# Patient Record
(demographics unavailable — no encounter records)

---

## 2017-02-16 NOTE — ECGEPIP
Stationary ECG Study

                              [facility]

                                       

                                       Test Date:    2017

Pat Name:     MONSERRAT HARKINS          Department:   

Patient ID:   [id number]                 Room:         -

Gender:       M                        Technician:   

:          1954               Requested By: JEET JOHNSON

Order Number: [id number]     Reading MD:   Jimbo Aguilar

                                 Measurements

Intervals                              Axis          

Rate:         94                       P:            77

VT:           169                      QRS:          59

QRSD:         92                       T:            71

QT:           329                                    

QTc:          412                                    

                           Interpretive Statements

Normal sinus rhythm

Low voltages, slow precordial R-wave progression and persistent S waves

V5 and V6

Body habitus versus pulmonary disease.

No prior tracing for comparison

Electronically Signed On 2017 9:13:32 EST by Jimbo Aguilar

## 2017-02-16 NOTE — RO
DATE OF PROCEDURE: 02/16/2017

 

PREOPERATIVE DIAGNOSIS: Left kidney stone.

 

POSTOPERATIVE DIAGNOSIS: Left kidney stone.

 

FINDINGS: 8 mm left kidney stone.

 

PROCEDURE:  Left extracorporeal shock wave lithotripsy.

 

SURGEON: Dr. Abilio Adams MD

 

ASSISTANT: None.

 

ANESTHESIA: General.

 

COMPLICATIONS: None.

 

ESTIMATED BLOOD LOSS: N/A.

 

HISTORY OF THE PRESENT ILLNESS: A 62-year-old male patient with an 8 mm renal

pelvis stone. The patient has consented for a left extracorporeal shock wave

lithotripsy.

 

DESCRIPTION OF OPERATION: With the patient in the supine position under general

anesthesia, after finding the stone with x-ray and ultrasound, we found a stone

of 8 mm in diameter in the upper pole of the left kidney. We gave a total of 2500

shock wave lithotripsies at a power of 1 to 20. The first 100 shock wave

lithotripsy was done at a power of 1 to 5, the final 100 shock waves were done at

a level of 6 to 10, the following 100 shock waves were done at a level of 11 to

15 and the following 2200 shock waves were done at level of 16 to 20.  There were

no complications of surgery. The patient tolerated well the procedure.

 

PLAN: The patient will go home today with Flomax and pain medication. He will

followup at [facility] in 3 weeks and to assess if the stone has

been pulverized or is still persistent. If it is still persistent, we will give a

second session or even a third session. There were no complications during the

procedure.

## 2017-03-09 NOTE — REP
KUB:  Single view.

 

History:  Kidney stone left-sided.

 

Comparison KUB February 16, 2017.  Comparison CT study January 27, 2017.

 

Findings:  The calcific opacity previously noted in the intrarenal collecting

system of the left kidney is no longer apparent.  No ureteral or upper tract

calculus is visible today.  There is vascular calcification in the pelvis

bilaterally.  There are clips in the right mid abdomen.  Bowel gas pattern is

normal.

 

Impression:

 

No urinary tract calculus visible today.

 

 

Signed by

José Loo MD 03/09/2017 01:14 P

## 2017-06-06 NOTE — ROOR
________________________________________________________________________________

Patient Name: Adams Barron          Procedure Date: 6/6/2017 11:13 AM

MRN: [id number]                          Account Number: [id number]

YOB: 1954               Age: 63

Room: [facility]                            Gender: Male

Note Status: Finalized                 

________________________________________________________________________________

 

Procedure:           Colonoscopy

Indications:         High risk colon cancer surveillance: Personal history of 

                     colonic polyps, Last colonoscopy 5 years or more ago

Providers:           Matthieu Winchester MD

Referring MD:        Natalia Andre MD

Requesting Provider: 

Medicines:           Monitored Anesthesia Care

Complications:       No immediate complications.

________________________________________________________________________________

Procedure:           Pre-Anesthesia Assessment:

                     - Prior to the procedure, a History and Physical was 

                     performed, and patient medications and allergies were 

                     reviewed. The patient is competent. The risks and 

                     benefits of the procedure and the sedation options and 

                     risks were discussed with the patient. All questions were 

                     answered and informed consent was obtained. Patient 

                     identification and proposed procedure were verified by 

                     the physician, the nurse and the anesthesiologist in the 

                     procedure room. Mental Status Examination: alert and 

                     oriented. Airway Examination: normal oropharyngeal airway 

                     and neck mobility. CV Examination: regular rate and 

                     rhythm. Prophylactic Antibiotics: The patient does not 

                     require prophylactic antibiotics. Prior Anticoagulants: 

                     The patient has taken no previous anticoagulant or 

                     antiplatelet agents. ASA Grade Assessment: I - A normal, 

                     healthy patient. After reviewing the risks and benefits, 

                     the patient was deemed in satisfactory condition to 

                     undergo the procedure. The anesthesia plan was to use 

                     monitored anesthesia care (MAC). Immediately prior to 

                     administration of medications, the patient was 

                     re-assessed for adequacy to receive sedatives. The heart 

                     rate, respiratory rate, oxygen saturations, blood 

                     pressure, adequacy of pulmonary ventilation, and response 

                     to care were monitored throughout the procedure. The 

                     physical status of the patient was re-assessed after the 

                     procedure.

                     The was introduced through the anus and advanced to the 

                     cecum, identified by appendiceal orifice and ileocecal 

                     valve. The colonoscopy was performed without difficulty. 

                     The patient tolerated the procedure well. The quality of 

                     the bowel preparation was excellent.

                                                                                

Findings:

     The perianal and digital rectal examinations were normal.

     A few small-mouthed diverticula were found in the sigmoid colon.

     Two sessile polyps were found in the descending colon. The polyps were 4 

     mm in size. These polyps were removed with a hot snare. Resection and 

     retrieval were complete.

     A 7 mm polyp was found at 20 cm proximal to the anus. The polyp was 

     sessile. The polyp was removed with a hot snare. Resection and retrieval 

     were complete. There were a few small sessile polyps consistent with 

     hyperplastic polyps.

                                                                                

Impression:          - Diverticulosis in the sigmoid colon.

                     - Two 4 mm polyps in the descending colon, removed with a 

                     hot snare. Resected and retrieved.

                     - One 7 mm polyp at 20 cm proximal to the anus, removed 

                     with a hot snare. Resected and retrieved.

Recommendation:      - Await pathology results.

                     - Telephone endoscopist for pathology results in 10 days.

                     - Discharge patient to home.

                     - Resume previous diet.

                     - Continue present medications.

                                                                                

 

__________________

Matthieu Winchester MD

6/6/2017 12:01:36 PM

Number of Addenda: 0

 

Note Initiated On: 6/6/2017 11:13 AM

Estimated Blood Loss:

     Estimated blood loss: none.

## 2019-03-01 NOTE — REP
KUB, ONE VIEW:

 

HISTORY:  Renal stone.

 

COMPARISON:  CT 01/27/2017.

 

A small amount of air is present in small and large intestine.  There are no air

fluid levels or dilated loops of intestine.  There is no pneumoperitoneum.  An 8

mm calcification is present overlying the left kidney.  This corresponds to the

calcification     seen in the left renal pelvis in the recent CT examination.

Surgical clips are present in the right abdomen.

 

IMPRESSION:

 

1.  Nonspecific bowel gas pattern.

 

2.  There is an 8 mm calcification overlying the left kidney corresponding to the

calcification      seen in the left renal pelvis in the recent CT examination.

 

 

Signed by

Du Carranza MD 02/16/2017 08:30 A Topical Sulfur Applications Counseling: Topical Sulfur Counseling: Patient counseled that this medication may cause skin irritation or allergic reactions. In the event of skin irritation, the patient was advised to reduce the amount of the drug applied or use it less frequently. The patient verbalized understanding of the proper use and possible adverse effects of topical sulfur application. All of the patient's questions and concerns were addressed. Azithromycin Counseling:  I discussed with the patient the risks of azithromycin including but not limited to GI upset, allergic reaction, drug rash, diarrhea, and yeast infections. Benzoyl Peroxide Pregnancy And Lactation Text: This medication is Pregnancy Category C. It is unknown if benzoyl peroxide is excreted in breast milk. High Dose Vitamin A Pregnancy And Lactation Text: High dose vitamin A therapy is contraindicated during pregnancy and breast feeding. Doxycycline Counseling:  Patient counseled regarding possible photosensitivity and increased risk for sunburn. Patient instructed to avoid sunlight, if possible. When exposed to sunlight, patients should wear protective clothing, sunglasses, and sunscreen. The patient was instructed to call the office immediately if the following severe adverse effects occur:  hearing changes, easy bruising/bleeding, severe headache, or vision changes. The patient verbalized understanding of the proper use and possible adverse effects of doxycycline. All of the patient's questions and concerns were addressed. Birth Control Pills Counseling: Birth Control Pill Counseling: I discussed with the patient the potential side effects of OCPs including but not limited to increased risk of stroke, heart attack, thrombophlebitis, deep venous thrombosis, hepatic adenomas, breast changes, GI upset, headaches, and depression. The patient verbalized understanding of the proper use and possible adverse effects of OCPs. All of the patient's questions and concerns were addressed. Spironolactone Pregnancy And Lactation Text: This medication can cause feminization of the male fetus and should be avoided during pregnancy. The active metabolite is also found in breast milk. Detail Level: Zone Include Pregnancy/Lactation Warning?: No Tazorac Pregnancy And Lactation Text: This medication is not safe during pregnancy. It is unknown if this medication is excreted in breast milk. Isotretinoin Counseling: Patient should get monthly blood tests, not donate blood, not drive at night if vision affected, not share medication, and not undergo elective surgery for 6 months after tx completed. Side effects reviewed, pt to contact office should one occur. Birth Control Pills Pregnancy And Lactation Text: This medication should be avoided if pregnant and for the first 30 days post-partum. Topical Sulfur Applications Pregnancy And Lactation Text: This medication is Pregnancy Category C and has an unknown safety profile during pregnancy. It is unknown if this topical medication is excreted in breast milk. Azithromycin Pregnancy And Lactation Text: This medication is considered safe during pregnancy and is also secreted in breast milk. Topical Retinoid counseling:  Patient advised to apply a pea-sized amount only at bedtime and wait 30 minutes after washing their face before applying. If too drying, patient may add a non-comedogenic moisturizer. The patient verbalized understanding of the proper use and possible adverse effects of retinoids. All of the patient's questions and concerns were addressed. Tetracycline Counseling: Patient counseled regarding possible photosensitivity and increased risk for sunburn. Patient instructed to avoid sunlight, if possible. When exposed to sunlight, patients should wear protective clothing, sunglasses, and sunscreen. The patient was instructed to call the office immediately if the following severe adverse effects occur:  hearing changes, easy bruising/bleeding, severe headache, or vision changes. The patient verbalized understanding of the proper use and possible adverse effects of tetracycline. All of the patient's questions and concerns were addressed. Patient understands to avoid pregnancy while on therapy due to potential birth defects. Minocycline Counseling: Patient advised regarding possible photosensitivity and discoloration of the teeth, skin, lips, tongue and gums. Patient instructed to avoid sunlight, if possible. When exposed to sunlight, patients should wear protective clothing, sunglasses, and sunscreen. The patient was instructed to call the office immediately if the following severe adverse effects occur:  hearing changes, easy bruising/bleeding, severe headache, or vision changes. The patient verbalized understanding of the proper use and possible adverse effects of minocycline. All of the patient's questions and concerns were addressed. Doxycycline Pregnancy And Lactation Text: This medication is Pregnancy Category D and not consider safe during pregnancy. It is also excreted in breast milk but is considered safe for shorter treatment courses. Topical Clindamycin Counseling: Patient counseled that this medication may cause skin irritation or allergic reactions. In the event of skin irritation, the patient was advised to reduce the amount of the drug applied or use it less frequently. The patient verbalized understanding of the proper use and possible adverse effects of clindamycin. All of the patient's questions and concerns were addressed. Bactrim Counseling:  I discussed with the patient the risks of sulfa antibiotics including but not limited to GI upset, allergic reaction, drug rash, diarrhea, dizziness, photosensitivity, and yeast infections. Rarely, more serious reactions can occur including but not limited to aplastic anemia, agranulocytosis, methemoglobinemia, blood dyscrasias, liver or kidney failure, lung infiltrates or desquamative/blistering drug rashes. Tetracycline Pregnancy And Lactation Text: This medication is Pregnancy Category D and not consider safe during pregnancy. It is also excreted in breast milk. Topical Retinoid Pregnancy And Lactation Text: This medication is Pregnancy Category C. It is unknown if this medication is excreted in breast milk. Isotretinoin Pregnancy And Lactation Text: This medication is Pregnancy Category X and is considered extremely dangerous during pregnancy. It is unknown if it is excreted in breast milk. Erythromycin Counseling:  I discussed with the patient the risks of erythromycin including but not limited to GI upset, allergic reaction, drug rash, diarrhea, increase in liver enzymes, and yeast infections. Dapsone Counseling: I discussed with the patient the risks of dapsone including but not limited to hemolytic anemia, agranulocytosis, rashes, methemoglobinemia, kidney failure, peripheral neuropathy, headaches, GI upset, and liver toxicity. Patients who start dapsone require monitoring including baseline LFTs and weekly CBCs for the first month, then every month thereafter. The patient verbalized understanding of the proper use and possible adverse effects of dapsone. All of the patient's questions and concerns were addressed. Topical Clindamycin Pregnancy And Lactation Text: This medication is Pregnancy Category B and is considered safe during pregnancy. It is unknown if it is excreted in breast milk. High Dose Vitamin A Counseling: Side effects reviewed, pt to contact office should one occur. Benzoyl Peroxide Counseling: Patient counseled that medicine may cause skin irritation and bleach clothing. In the event of skin irritation, the patient was advised to reduce the amount of the drug applied or use it less frequently. The patient verbalized understanding of the proper use and possible adverse effects of benzoyl peroxide. All of the patient's questions and concerns were addressed. Bactrim Pregnancy And Lactation Text: This medication is Pregnancy Category D and is known to cause fetal risk. It is also excreted in breast milk. Tazorac Counseling:  Patient advised that medication is irritating and drying. Patient may need to apply sparingly and wash off after an hour before eventually leaving it on overnight. The patient verbalized understanding of the proper use and possible adverse effects of tazorac. All of the patient's questions and concerns were addressed. Spironolactone Counseling: Patient advised regarding risks of diarrhea, abdominal pain, hyperkalemia, birth defects (for female patients), liver toxicity and renal toxicity. The patient may need blood work to monitor liver and kidney function and potassium levels while on therapy. The patient verbalized understanding of the proper use and possible adverse effects of spironolactone. All of the patient's questions and concerns were addressed. Erythromycin Pregnancy And Lactation Text: This medication is Pregnancy Category B and is considered safe during pregnancy. It is also excreted in breast milk. Dapsone Pregnancy And Lactation Text: This medication is Pregnancy Category C and is not considered safe during pregnancy or breast feeding.